# Patient Record
Sex: MALE | Race: WHITE
[De-identification: names, ages, dates, MRNs, and addresses within clinical notes are randomized per-mention and may not be internally consistent; named-entity substitution may affect disease eponyms.]

---

## 2019-03-07 ENCOUNTER — HOSPITAL ENCOUNTER (OUTPATIENT)
Dept: HOSPITAL 62 - RAD | Age: 44
End: 2019-03-07
Attending: NURSE PRACTITIONER
Payer: MEDICAID

## 2019-03-07 DIAGNOSIS — R10.2: ICD-10-CM

## 2019-03-07 DIAGNOSIS — N50.811: Primary | ICD-10-CM

## 2019-03-07 PROCEDURE — 76870 US EXAM SCROTUM: CPT

## 2019-03-07 PROCEDURE — 76856 US EXAM PELVIC COMPLETE: CPT

## 2019-03-08 NOTE — RADIOLOGY REPORT (SQ)
EXAM DESCRIPTION:  U/S SCROTUM W/O DOPPLER; U/S NON-OB PELVIS W/O DOP



COMPLETED DATE/TIME:  3/7/2019 5:38 pm



REASON FOR STUDY:  RIGHT TESTICULAR PAIN;PELVIC AND PERINEAL PAIN N50.811  RIGHT TESTICULAR PAIN R10.
2  PELVIC AND PERINEAL PAIN



COMPARISON:  None.



TECHNIQUE:  Static and realtime gray scale imaging of the scrotum and testes.  Selected color Doppler
 and spectral images recorded to document blood flow.

Ultrasound of the right and left inguinal region was performed, with grayscale and cine loop Valsalva
 images saved to pacs.



LIMITATIONS:  None.



FINDINGS:  RIGHT:

TESTICLE: Normal size, 4.2 x 3.3 x 1.9 cm. Normal echotexture, with few scattered punctate calcificat
ions.  Normal blood flow.  No mass.

EPIDIDYMIS: Normal.  5 mm epididymal cyst.

HYDROCELE OR VARICOCELE: No.

HERNIA OR EXTRA-TESTICULAR MASS: No.

OTHER: No other significant finding.

LEFT:

TESTICLE: Normal size, 4.3 x 2.6 x 1.8 cm. Normal echotexture, with few scattered punctate calcificat
ions.  Normal blood flow.  No mass.

EPIDIDYMIS: Normal.

HYDROCELE OR VARICOCELE: No.

HERNIA OR EXTRA-TESTICULAR MASS: No.

OTHER: No other significant finding.

Ultrasound of the bilateral inguinal regions was performed, with grayscale and cine loop Valsalva lianne
ging.  No right or left inguinal hernia.  Benign-appearing small bilateral inguinal lymph nodes are p
resent, less than 1.5 cm in size.



IMPRESSION:  ESSENTIALLY NORMAL SCROTAL ULTRASOUND. NO EVIDENCE OF TESTICULAR MASS OR TORSION.

BENIGN SMALL BILATERAL INGUINAL LYMPH NODES.  NO RIGHT OR LEFT INGUINAL HERNIA.



TECHNICAL DOCUMENTATION:  JOB ID:  [id number]

 [employer]- All Rights Reserved



Reading location - IP[facility]workstation name: [email or web address]

## 2019-03-08 NOTE — RADIOLOGY REPORT (SQ)
EXAM DESCRIPTION:  U/S SCROTUM W/O DOPPLER; U/S NON-OB PELVIS W/O DOP



COMPLETED DATE/TIME:  3/7/2019 5:38 pm



REASON FOR STUDY:  RIGHT TESTICULAR PAIN;PELVIC AND PERINEAL PAIN N50.811  RIGHT TESTICULAR PAIN R10.
2  PELVIC AND PERINEAL PAIN



COMPARISON:  None.



TECHNIQUE:  Static and realtime gray scale imaging of the scrotum and testes.  Selected color Doppler
 and spectral images recorded to document blood flow.

Ultrasound of the right and left inguinal region was performed, with grayscale and cine loop Valsalva
 images saved to pacs.



LIMITATIONS:  None.



FINDINGS:  RIGHT:

TESTICLE: Normal size, 4.2 x 3.3 x 1.9 cm. Normal echotexture, with few scattered punctate calcificat
ions.  Normal blood flow.  No mass.

EPIDIDYMIS: Normal.  5 mm epididymal cyst.

HYDROCELE OR VARICOCELE: No.

HERNIA OR EXTRA-TESTICULAR MASS: No.

OTHER: No other significant finding.

LEFT:

TESTICLE: Normal size, 4.3 x 2.6 x 1.8 cm. Normal echotexture, with few scattered punctate calcificat
ions.  Normal blood flow.  No mass.

EPIDIDYMIS: Normal.

HYDROCELE OR VARICOCELE: No.

HERNIA OR EXTRA-TESTICULAR MASS: No.

OTHER: No other significant finding.

Ultrasound of the bilateral inguinal regions was performed, with grayscale and cine loop Valsalva lianne
ging.  No right or left inguinal hernia.  Benign-appearing small bilateral inguinal lymph nodes are p
resent, less than 1.5 cm in size.



IMPRESSION:  ESSENTIALLY NORMAL SCROTAL ULTRASOUND. NO EVIDENCE OF TESTICULAR MASS OR TORSION.

BENIGN SMALL BILATERAL INGUINAL LYMPH NODES.  NO RIGHT OR LEFT INGUINAL HERNIA.



TECHNICAL DOCUMENTATION:  JOB ID:  [id number]

 [employer]- All Rights Reserved



Reading location - IP/workstation name: [email or web address]